# Patient Record
Sex: MALE | Race: WHITE | URBAN - METROPOLITAN AREA
[De-identification: names, ages, dates, MRNs, and addresses within clinical notes are randomized per-mention and may not be internally consistent; named-entity substitution may affect disease eponyms.]

---

## 2024-10-24 ENCOUNTER — OFFICE VISIT (OUTPATIENT)
Dept: ORTHOPEDIC SURGERY | Age: 59
End: 2024-10-24
Payer: OTHER GOVERNMENT

## 2024-10-24 DIAGNOSIS — R52 PAIN: Primary | ICD-10-CM

## 2024-10-24 DIAGNOSIS — M25.571 ACUTE RIGHT ANKLE PAIN: ICD-10-CM

## 2024-10-24 PROCEDURE — 99203 OFFICE O/P NEW LOW 30 MIN: CPT | Performed by: ORTHOPAEDIC SURGERY

## 2024-10-24 PROCEDURE — L4361 PNEUMA/VAC WALK BOOT PRE OTS: HCPCS | Performed by: ORTHOPAEDIC SURGERY

## 2024-10-24 PROCEDURE — M5007 MISC HEEL LIFT: HCPCS | Performed by: ORTHOPAEDIC SURGERY

## 2024-10-24 NOTE — PROGRESS NOTES
The patient was prescribed a walker boot and ½” heel lift for the patient's right foot. The patient wears a size 9 shoe and I fitted them with a M size boot. There was also a pad placed on the Achilles to prevent from rubbing while in the boot. It was also stressed that the patient take the boot/sock off as much as possible to prevent infection and to insure healing. The patient was told to peel the heel lift every 3-5 days. The patient was fitted and instructed on the use of prescribed walker boot. I explained how to fit themselves and that the plastic flexible piece should always be on the front of the boot and secured by the Velcro straps on top. The air bladder in the boot was adjusted according to proper fit and comfort. The patient was instructed to walk heel toe once the patient starts applying pressure. I also explained that they need a heel lift or a higher heeled shoe for the uninvolved LE to help normalize gait and avoid excessive low back stress/strain due to leg length inequality created from walker boot. Patient read and signed documenting they understand and agree to [facility] current DME return policy.Patient read and signed documenting they understand and agree to [facility] current DME return policy.

## 2024-10-24 NOTE — PROGRESS NOTES
Name: Roland Lozano  YOB: 1965  Gender: male  MRN: [id number]    Left myotendinous junction Achilles/proximal medial head of the gastroc tear.    Cam boot with 2 heel lifts.  Follow-up in 4 weeks.  At that visit we will start to transition into therapy and out of the boot with heel lift       History of Present Illness  The patient is a 59-year-old male who presents for evaluation of right-sided pain.    On 10/11/2024, he was engaged in a game of golf. The weather was slightly cooler than usual, and he did not perform his usual stretching exercises. While ascending a steep hill and pushing his bag, he experienced a popping sensation on his right side.    He sought immediate medical attention at an urgent care facility the following day. The attending nurse confirmed that his Achilles tendon was intact. He applied compression to the area for the first week.    He expressed a desire to resume jogging, a regular part of his exercise routine.    Tobacco:  has no history on file for tobacco use.  No results found for: \"LABA1C\"    Physcial Exam  Achilles tendon is intact.  Proximal medial head of the gastroc with a myotendinous region does have a small defect.  This is where he is tender.  Ecchymoses still noted.  4-5 strength of plantarflexion of the ankle    Imaging:   Interpretation of imaging,   X-Ray RIGHT Ankle 3 vw (AP/Lateral/Oblique) for ankle pain   Findings: No signs of displacement of the mortise.  No signs of acute injury or fracture to the talus, mortise, fibula, or distal tibia. No signs of chronic damage, neoplastic process or infection   Impression:  Stable ankle    Signature: Albino Rose MD   , and   XR right tibia 2 view (AP/Lat) for lower leg pain   Findings: No signs of fracture or dislocations of the proximal or midshaft fibula or tibia.  No signs of Proximal or distal tib/fib dislocation.  No signs of neoplastic processes or acute injuries. The knee and ankle are not fully

## 2024-11-21 ENCOUNTER — OFFICE VISIT (OUTPATIENT)
Dept: ORTHOPEDIC SURGERY | Age: 59
End: 2024-11-21
Payer: OTHER GOVERNMENT

## 2024-11-21 DIAGNOSIS — M25.571 ACUTE RIGHT ANKLE PAIN: ICD-10-CM

## 2024-11-21 DIAGNOSIS — R52 PAIN: Primary | ICD-10-CM

## 2024-11-21 PROCEDURE — 99213 OFFICE O/P EST LOW 20 MIN: CPT | Performed by: ORTHOPAEDIC SURGERY

## 2024-11-21 NOTE — PROGRESS NOTES
Name: Roland Lozano  YOB: 1965  Gender: male  MRN: [id number]    Left myotendinous junction Achilles/proximal medial head of the gastroc tear.    Slowly transition out of cam boot into normal shoes.  He will begin therapy to work on Achilles strengthening    Follow-up in 8 weeks no x-rays       History of Present Illness  The patient is a 59-year-old male who presents for evaluation of right-sided pain.    On 10/11/2024, he was engaged in a game of golf. The weather was slightly cooler than usual, and he did not perform his usual stretching exercises. While ascending a steep hill and pushing his bag, he experienced a popping sensation on his right side.    He sought immediate medical attention at an urgent care facility the following day. The attending nurse confirmed that his Achilles tendon was intact. He applied compression to the area for the first week.    He expressed a desire to resume jogging, a regular part of his exercise routine.    11/21-he has been in the boot.  Is making good progress.  Denies pain and swelling.  Overall he is making good improvements.    Tobacco:  has no history on file for tobacco use.  No results found for: \"LABA1C\"    Physcial Exam  Achilles tendon is intact.  Proximal medial head of the gastroc with a myotendinous region does have a small defect.  T when he plantarflex his foot the Achilles tendon is intact.  There is no signs of Achilles tendon rupture and the continuity of the myotendinous complex is still intact.    Imaging:   Interpretation of imaging,   X-Ray RIGHT Ankle 3 vw (AP/Lateral/Oblique) for ankle pain   Findings: No signs of displacement of the mortise.  No signs of acute injury or fracture to the talus, mortise, fibula, or distal tibia. No signs of chronic damage, neoplastic process or infection   Impression:  Stable ankle    Signature: Albino Rose MD   , and   XR right tibia 2 view (AP/Lat) for lower leg pain   Findings: No signs of

## 2024-12-03 ENCOUNTER — HOSPITAL ENCOUNTER (OUTPATIENT)
Dept: PHYSICAL THERAPY | Age: 59
Setting detail: RECURRING SERIES
Discharge: HOME | End: 2024-12-06
Payer: OTHER GOVERNMENT

## 2024-12-03 DIAGNOSIS — R26.2 DIFFICULTY IN WALKING: ICD-10-CM

## 2024-12-03 DIAGNOSIS — R26.81 UNSTEADINESS: ICD-10-CM

## 2024-12-03 DIAGNOSIS — M25.571 PAIN IN JOINT INVOLVING ANKLE AND FOOT, RIGHT: Primary | ICD-10-CM

## 2024-12-03 PROCEDURE — 97110 THERAPEUTIC EXERCISES: CPT

## 2024-12-03 PROCEDURE — 97161 PT EVAL LOW COMPLEX 20 MIN: CPT

## 2024-12-03 ASSESSMENT — PAIN SCALES - GENERAL: PAINLEVEL_OUTOF10: 2

## 2024-12-03 NOTE — THERAPY EVALUATION
Roland Lozano  : 1965  Primary:  [facility] ([facility])  Secondary:  [facility]  [address]  [address]  [facility] [address]  Phone: [phone]  Fax: [phone] Plan Frequency: 1-2x/wk for 60 days  Plan of Care/Certification Expiration Date: 25        Plan of Care/Certification Expiration Date:  Plan of Care/Certification Expiration Date: 25    Frequency/Duration: Plan Frequency: 1-2x/wk for 60 days      Time In/Out:   Time In: 0950  Time Out: 1030      PT Visit Info:    Total # of Visits to Date: 1      Visit Count:  1                OUTPATIENT PHYSICAL THERAPY:             Initial Assessment 12/3/2024               Episode (R gastroc tear)         Treatment Diagnosis:     Pain in joint involving ankle and foot, right  Difficulty in walking  Unsteadiness  Medical/Referring Diagnosis:    Pain, unspecified [R52]  Pain in left ankle and joints of left foot [M25.572]      Referring Physician:  Albino Rose MD MD Orders:  PT Eval and Treat, 4x/wk for 4 weeks  Return MD Appt:  25  Date of Onset:  Onset Date: 10/11/24     Allergies:  Shrimp extract  Restrictions/Precautions:    Gastroc tear      Medications Last Reviewed:  12/3/2024     SUBJECTIVE   History of Injury/Illness (Reason for Referral):  Pt was at golf when walking up a hill pushing his bag and he felt a pop in his calf. Urgent care ruled out achilles tear. He eventually went to his orthopedic MD. They found a tear in his gastroc muscle. He was told to wear a boot and is now weaning out of it and weaning off heel lifts.   Patient Stated Goal(s):  \"get back to golf, hiking, walking\"  Initial Pain Level:      2/10   Post Session Pain Level:     2/10  Past Medical History/Comorbidities:   Mr. Lozano  has no past medical history on file.  Mr. Lozano  has no past surgical history on file.  Social History/Living Environment:   Patient did not mention his living environment     Prior Level of

## 2024-12-03 NOTE — PROGRESS NOTES
Roland Poondelia  : 1965  Primary:  [facility] ([facility])  [facility]:  [facility]  [address]  [address]  [facility] [address]  Phone: [phone]  Fax: [phone] Plan Frequency: 1-2x/wk for 60 days    Plan of Care/Certification Expiration Date: 25        Plan of Care/Certification Expiration Date:  Plan of Care/Certification Expiration Date: 25    Frequency/Duration: Plan Frequency: 1-2x/wk for 60 days      Time In/Out:   Time In: 0950  Time Out: 1030      PT Visit Info:    Total # of Visits to Date: 1      Visit Count:  1    OUTPATIENT PHYSICAL THERAPY:   Treatment Note 12/3/2024       Episode  (R gastroc tear)               Treatment Diagnosis:    Pain in joint involving ankle and foot, right  Difficulty in walking  Unsteadiness  Medical/Referring Diagnosis:    Pain, unspecified [R52]  Pain in left ankle and joints of left foot [M25.572]      Referring Physician:  Albino Rose MD MD Orders:  PT Eval and Treat, 4x/wk for 4 weeks   Return MD Appt:  25  Date of Onset:  Onset Date: 10/11/24     Allergies:   Shrimp extract  Restrictions/Precautions:   R gastroc tear      Interventions Planned (Treatment may consist of any combination of the following):     See Assessment Note    Subjective Comments:   Pt reports he was golfing and pushing his bag up a very steep hill when he felt a pop. Went to urgent care and was negative for an achilles injury. Went to ortho MD and was told he has a tear in his gastroc. He completed wearing his boot and is weaning off of the heel lifts now.   Initial Pain Level::     2/10  Post Session Pain Level:       2/10  Medications Last Reviewed:  12/3/2024  Updated Objective Findings:  See Evaluation Note from today  Treatment   THERAPEUTIC EXERCISE: (10 minutes):    Exercises per grid below to improve mobility, strength, balance, and coordination.  Required minimal verbal and tactile cues to promote proper body alignment, promote proper body

## 2024-12-10 ENCOUNTER — HOSPITAL ENCOUNTER (OUTPATIENT)
Dept: PHYSICAL THERAPY | Age: 59
Setting detail: RECURRING SERIES
Discharge: HOME | End: 2024-12-13
Attending: ORTHOPAEDIC SURGERY
Payer: OTHER GOVERNMENT

## 2024-12-10 PROCEDURE — 97110 THERAPEUTIC EXERCISES: CPT

## 2024-12-10 ASSESSMENT — PAIN SCALES - GENERAL: PAINLEVEL_OUTOF10: 2

## 2024-12-10 NOTE — PROGRESS NOTES
Roland Marta  : 1965  Primary:  [facility] ([facility])  Secondary:  [facility]  [address]  [address]  [facility] [address]  Phone: [phone]  Fax: [phone] Plan Frequency: 1-2x/wk for 60 days    Plan of Care/Certification Expiration Date: 25        Plan of Care/Certification Expiration Date:  Plan of Care/Certification Expiration Date: 25    Frequency/Duration: Plan Frequency: 1-2x/wk for 60 days      Time In/Out:   Time In: 0730  Time Out: 0816      PT Visit Info:    Total # of Visits to Date: 2      Visit Count:  2    OUTPATIENT PHYSICAL THERAPY:   Treatment Note 12/10/2024       Episode  (R gastroc tear)               Treatment Diagnosis:    Pain in joint involving ankle and foot, right  Difficulty in walking  Unsteadiness  Medical/Referring Diagnosis:    Pain, unspecified [R52]  Pain in left ankle and joints of left foot [M25.572]      Referring Physician:  Albino Rose MD MD Orders:  PT Eval and Treat, 4x/wk for 4 weeks   Return MD Appt:  25  Date of Onset:  Onset Date: 10/11/24     Allergies:   Shrimp extract  Restrictions/Precautions:   R gastroc tear      Interventions Planned (Treatment may consist of any combination of the following):     See Assessment Note    Subjective Comments:   Pt reports he could tell when he walked up a little hill but overall feels fine. No issues w/ HEP.   Initial Pain Level::     2/10  Post Session Pain Level:       3/10  Medications Last Reviewed:  12/10/2024  Updated Objective Findings:  None Today  Treatment   THERAPEUTIC EXERCISE: (40 minutes):    Exercises per grid below to improve mobility, strength, balance, and coordination.  Required minimal verbal and tactile cues to promote proper body alignment, promote proper body posture, and promote proper body mechanics.  Progressed resistance, range, repetitions, and complexity of movement as indicated.  MANUAL THERAPY: (0 minutes):   Joint mobilization and Soft tissue

## 2024-12-17 ENCOUNTER — HOSPITAL ENCOUNTER (OUTPATIENT)
Dept: PHYSICAL THERAPY | Age: 59
Setting detail: RECURRING SERIES
Discharge: HOME | End: 2024-12-20
Attending: ORTHOPAEDIC SURGERY
Payer: OTHER GOVERNMENT

## 2024-12-17 PROCEDURE — 97110 THERAPEUTIC EXERCISES: CPT

## 2024-12-17 ASSESSMENT — PAIN SCALES - GENERAL: PAINLEVEL_OUTOF10: 2

## 2024-12-17 NOTE — PROGRESS NOTES
Roland Poondelia  : 1965  Primary:  [facility])  Secondary:  [facility]  [address]  [address]  [facility] [address]  Phone: [phone]  Fax: [phone] Plan Frequency: 1-2x/wk for 60 days    Plan of Care/Certification Expiration Date: 25        Plan of Care/Certification Expiration Date:  Plan of Care/Certification Expiration Date: 25    Frequency/Duration: Plan Frequency: 1-2x/wk for 60 days      Time In/Out:   Time In: 1030  Time Out: 1115      PT Visit Info:    Total # of Visits to Date: 3      Visit Count:  3    OUTPATIENT PHYSICAL THERAPY:   Treatment Note 2024       Episode  (R gastroc tear)               Treatment Diagnosis:    Pain in joint involving ankle and foot, right  Difficulty in walking  Unsteadiness  Medical/Referring Diagnosis:    Pain, unspecified [R52]  Pain in left ankle and joints of left foot [M25.572]      Referring Physician:  Albino Rose MD MD Orders:  PT Eval and Treat, 4x/wk for 4 weeks   Return MD Appt:  25  Date of Onset:  Onset Date: 10/11/24     Allergies:   Shrimp extract  Restrictions/Precautions:   R gastroc tear      Interventions Planned (Treatment may consist of any combination of the following):     See Assessment Note    Subjective Comments:   Pt reports he walked and did better but still a little sore. Also thinks he over did it with soleus calf raise so he stopped those.   Initial Pain Level::     2/10  Post Session Pain Level:       0/10  Medications Last Reviewed:  2024  Updated Objective Findings:  None Today  Treatment   THERAPEUTIC EXERCISE: (40 minutes):    Exercises per grid below to improve mobility, strength, balance, and coordination.  Required minimal verbal and tactile cues to promote proper body alignment, promote proper body posture, and promote proper body mechanics.  Progressed resistance, range, repetitions, and complexity of movement as indicated.  MANUAL THERAPY: (0 minutes):   Joint

## 2024-12-30 ENCOUNTER — HOSPITAL ENCOUNTER (OUTPATIENT)
Dept: PHYSICAL THERAPY | Age: 59
Setting detail: RECURRING SERIES
Discharge: HOME | End: 2025-01-02
Attending: ORTHOPAEDIC SURGERY
Payer: OTHER GOVERNMENT

## 2024-12-30 PROCEDURE — 97110 THERAPEUTIC EXERCISES: CPT

## 2024-12-30 ASSESSMENT — PAIN SCALES - GENERAL: PAINLEVEL_OUTOF10: 2

## 2024-12-30 NOTE — PROGRESS NOTES
3 x weekly - 2 sets - 10 reps - 5 hold  - Standing 3-Way Leg Reach with Resistance at Ankles and Counter Support  - 1 x daily - 3 x weekly - 2 sets - 10 reps - 5 hold  - Semi-Tandem Balance at Counter Top Eyes Open  - 1 x daily - 3 x weekly - 2 sets - 10 reps - 5 hold  - Feet Together Eyes Open  - 1 x daily - 3 x weekly - 2 sets - 10 reps - 5 hold  - Soleus Heel Raise in Stride Stance Position  - 1 x daily - 3 x weekly - 2 sets - 10 reps - 5 hold  - Wall Squat with Swiss Ball  - 1 x daily - 3 x weekly - 2 sets - 10 reps - 5 hold  - Single Leg Quarter Squat with Swiss Ball at Wall  - 1 x daily - 3 x weekly - 2 sets - 10 reps - 5 hold

## 2025-01-07 ENCOUNTER — HOSPITAL ENCOUNTER (OUTPATIENT)
Dept: PHYSICAL THERAPY | Age: 60
Setting detail: RECURRING SERIES
Discharge: HOME | End: 2025-01-10
Attending: ORTHOPAEDIC SURGERY
Payer: OTHER GOVERNMENT

## 2025-01-07 PROCEDURE — 97140 MANUAL THERAPY 1/> REGIONS: CPT

## 2025-01-07 PROCEDURE — 97110 THERAPEUTIC EXERCISES: CPT

## 2025-01-07 ASSESSMENT — PAIN SCALES - GENERAL: PAINLEVEL_OUTOF10: 1

## 2025-01-07 NOTE — PROGRESS NOTES
sideways ball tosses on trampoline  On solid ground SL ball tosses on trampoline  Balance pole band moving: adduction, extension, abduction, flexion red sports cord at ankle  Cone taps 10x each   Step up and over  10x B 6 inch step forwards  10x B 8 inch step sideways  10x B 8 inch step forwards and sideways 10x B 8 inch step forwards and sideways   Heel tap  10x B 6 inch step 2x10 B 6 inch step 10x B 6 inch step 2x10 B on 6 inch step   Squats  Sit to stand regular and staggered 10x each  Wall ball squats 5x each and staggered  See above    Band walks   Side and forward orange band 20x          Treatment/Session Summary:    Treatment Assessment:   Pt had tightness and tenderness with STM. His ROM improved as did his soreness w/ STM and mobilizations. He was challenged w/ strengthening and balance work.   Communication/Consultation:  None today  Equipment provided today:  HEP  Recommendations/Intent for next treatment session: Next visit will focus on progression of functional activities as tolerated.    >Total Treatment Billable Duration:  30 minutes therex, 10 mins manual   Time In: 0857  Time Out: 0946    Valeria Palacio PT         Charge Capture  Events  [place]  Appt Desk  Attendance Report     Future Appointments   Date Time Provider Department Center   1/9/2025  8:00 AM Albino Rose MD [facility]   1/13/2025  8:15 AM Valeria Palacio PT [facility]     Access Code: [email or web address]  URL: [email or web address]  Date: 12/10/2024  Prepared by: Valeria Palacio    Exercises  - Seated Calf Stretch with Strap  - 1 x daily - 7 x weekly - 1 sets - 10 reps - 5 hold  - Long Sitting Ankle Eversion with Resistance  - 1 x daily - 3 x weekly - 2 sets - 10 reps - 5 hold  - Long Sitting Ankle Plantar Flexion with Resistance  - 1 x daily - 3 x weekly - 2 sets - 10 reps - 5 hold  - Long Sitting Ankle Dorsiflexion with Anchored Resistance  - 1 x daily - 3 x weekly - 2 sets - 10 reps - 5 hold  - Long

## 2025-01-09 ENCOUNTER — OFFICE VISIT (OUTPATIENT)
Dept: ORTHOPEDIC SURGERY | Age: 60
End: 2025-01-09
Payer: OTHER GOVERNMENT

## 2025-01-09 DIAGNOSIS — M25.571 ACUTE RIGHT ANKLE PAIN: Primary | ICD-10-CM

## 2025-01-09 PROCEDURE — 99213 OFFICE O/P EST LOW 20 MIN: CPT | Performed by: ORTHOPAEDIC SURGERY

## 2025-01-09 NOTE — PROGRESS NOTES
Name: Roland Lozano  YOB: 1965  Gender: male  MRN: [id number]    Left myotendinous junction Achilles/proximal medial head of the gastroc tear.    Doing very well.  He will slowly increase his activities as tolerated.  He will follow-up as needed       History of Present Illness  The patient is a 59-year-old male who presents for evaluation of right-sided pain.    On 10/11/2024, he was engaged in a game of golf. The weather was slightly cooler than usual, and he did not perform his usual stretching exercises. While ascending a steep hill and pushing his bag, he experienced a popping sensation on his right side.    He sought immediate medical attention at an urgent care facility the following day. The attending nurse confirmed that his Achilles tendon was intact. He applied compression to the area for the first week.    He expressed a desire to resume jogging, a regular part of his exercise routine.    11/21-he has been in the boot.  Is making good progress.  Denies pain and swelling.  Overall he is making good improvements.    1/9/25-presents today normal shoes.  Having much less symptoms.  Still some soreness after he is been on a long walk a lot of activities and making good progress    Tobacco:  has no history on file for tobacco use.  No results found for: \"LABA1C\"    Physcial Exam  Achilles tendon is intact.  Proximal medial head of the gastroc with a myotendinous region does have a small defect.  T when he plantarflex his foot the Achilles tendon is intact.  There is no signs of Achilles tendon rupture and the continuity of the myotendinous complex is still intact.    Imaging:   Interpretation of imaging,   X-Ray RIGHT Ankle 3 vw (AP/Lateral/Oblique) for ankle pain   Findings: No signs of displacement of the mortise.  No signs of acute injury or fracture to the talus, mortise, fibula, or distal tibia. No signs of chronic damage, neoplastic process or infection   Impression:  Stable ankle

## 2025-01-13 ENCOUNTER — HOSPITAL ENCOUNTER (OUTPATIENT)
Dept: PHYSICAL THERAPY | Age: 60
Setting detail: RECURRING SERIES
Discharge: HOME | End: 2025-01-16
Attending: ORTHOPAEDIC SURGERY
Payer: OTHER GOVERNMENT

## 2025-01-13 PROCEDURE — 97110 THERAPEUTIC EXERCISES: CPT

## 2025-01-13 ASSESSMENT — PAIN SCALES - GENERAL: PAINLEVEL_OUTOF10: 2

## 2025-01-13 NOTE — THERAPY DISCHARGE
increasing his FAAM score from 72 to 102/116. Additionally, he can perform both knee bent and knee straight calf raises w/o increasing his pain. Mr. Lozano has returned to walking and golfing but still gets mild pain in his calf. Pt requests to be discharged at this time. Thank you for this referral.          PLAN   Effective Dates: 12/3/2024 TO Plan of Care/Certification Expiration Date: 02/01/25     Frequency/Duration: Plan Frequency: 1-2x/wk for 60 days      Interventions Planned (Treatment may consist of any combination of the following):    Balance Training, Endurance Training, Functional Mobility Training, Gait Training, Home Exercise Program (HEP), Manual Therapy, Neuromuscular Re-education/Strengthening, Modalities:,   Heat/Cold,   E-stim - unattended, and   Vasopneumatic Device, Range of Motion (ROM), Stair Training, Therapeutic Activites, and Therapeutic Exercise/Strengthening   Goals: (Goals have been discussed and agreed upon with patient.)  Short Term Goals: 30 days  1. Pt will be independent w/ HEP in order to improve outcomes and decrease pain. MET  2. Pt will have FAAM score of 77 or greater displaying decreased functional impairments and pain levels. MET  3. Pt will perform >5 continuous minutes of standing dynamic balance w/o LOB or use of UE assist. MET    Long Term Goals: 60 days  1. Pt will have FAAM score of 82 or greater displaying decreased functional impairments and pain levels. MET  2. Pt will be able to ascend/descend 13 steps w/ a reciprocal gait pattern w/o LOB. MET  3. Pt will have 5/5 R ankle strength in order to safely perform daily functional activities. MET  4. Pt will have R DF ROM of 20* in order to improve his functional squats. MET             Regarding Roland Lozano's therapy, I certify that the treatment plan above will be carried out by a therapist or under their direction.  Thank you for this referral,  Valeria Palacio, PT     Referring Physician Signature: Albino Rose

## 2025-01-13 NOTE — PROGRESS NOTES
Rolandtorres Housejon  : 1965  Primary:  [facility]  Secondary:  [facility]  [address]  [address]  [facility] [address]  Phone: [phone]  Fax: [phone] Plan Frequency: 1-2x/wk for 60 days    Plan of Care/Certification Expiration Date: 25        Plan of Care/Certification Expiration Date:  Plan of Care/Certification Expiration Date: 25    Frequency/Duration: Plan Frequency: 1-2x/wk for 60 days      Time In/Out:   Time In: 815  Time Out: 905      PT Visit Info:    Total # of Visits to Date: 6      Visit Count:  6    OUTPATIENT PHYSICAL THERAPY:   Treatment Note 2025       Episode  (R gastroc tear)               Treatment Diagnosis:    Pain in joint involving ankle and foot, right  Difficulty in walking  Unsteadiness  Medical/Referring Diagnosis:    Pain, unspecified [R52]  Pain in left ankle and joints of left foot [M25.572]      Referring Physician:  Albino Rose MD MD Orders:  PT Eval and Treat, 4x/wk for 4 weeks   Return MD Appt:  25  Date of Onset:  Onset Date: 10/11/24     Allergies:   Shrimp extract  Restrictions/Precautions:   R gastroc tear      Interventions Planned (Treatment may consist of any combination of the following):     See Assessment Note    Subjective Comments:   Pt reports he walked some and did well but still has a little bit of soreness.   Initial Pain Level::     2/10  Post Session Pain Level:       0/10  Medications Last Reviewed:  2025  Updated Objective Findings:  None Today  Treatment   THERAPEUTIC EXERCISE: (40 minutes):    Exercises per grid below to improve mobility, strength, balance, and coordination.  Required minimal verbal and tactile cues to promote proper body alignment, promote proper body posture, and promote proper body mechanics.  Progressed resistance, range, repetitions, and complexity of movement as indicated.  MANUAL THERAPY: (5 minutes):   Joint mobilization and Soft tissue mobilization was utilized

## 2025-01-21 ENCOUNTER — APPOINTMENT (OUTPATIENT)
Dept: PHYSICAL THERAPY | Age: 60
End: 2025-01-21
Attending: ORTHOPAEDIC SURGERY
Payer: OTHER GOVERNMENT